# Patient Record
Sex: FEMALE | Race: ASIAN | Employment: FULL TIME | URBAN - METROPOLITAN AREA
[De-identification: names, ages, dates, MRNs, and addresses within clinical notes are randomized per-mention and may not be internally consistent; named-entity substitution may affect disease eponyms.]

---

## 2021-12-06 ENCOUNTER — HOSPITAL ENCOUNTER (OUTPATIENT)
Age: 51
Discharge: HOME | End: 2021-12-06
Payer: COMMERCIAL

## 2021-12-06 VITALS
OXYGEN SATURATION: 100 % | DIASTOLIC BLOOD PRESSURE: 91 MMHG | SYSTOLIC BLOOD PRESSURE: 155 MMHG | RESPIRATION RATE: 16 BRPM | HEART RATE: 82 BPM | TEMPERATURE: 98 F

## 2021-12-06 DIAGNOSIS — U07.1 COVID-19: Primary | ICD-10-CM

## 2021-12-06 PROCEDURE — M0243 CASIRIVI AND IMDEVI INFUSION: HCPCS | Performed by: PHYSICIAN ASSISTANT

## 2021-12-06 PROCEDURE — 99204 OFFICE O/P NEW MOD 45 MIN: CPT | Performed by: PHYSICIAN ASSISTANT

## 2021-12-06 NOTE — ED QUICK NOTES
Patient arrived for polyclonal antibody infusion. PIV started and medication given. Call light within reach. VS rechecked per protocol. Pt tolerated infusion well, no adverse reactions noted. Infusion line flushed per protocol with 0.9NS.   PIV discontin

## 2021-12-06 NOTE — ED PROVIDER NOTES
Patient Seen in: [facility] [address] [place] [address]      History   Patient presents with:  Covid    Stated Complaint: Covid-19 Infusion    Subjective:   HPI    CHIEF COMPLAINT: Covid positive, desires polyclonal infusion.      HISTORY OF PRESENT ILLNESS: [employer]    Positive for stated complaint: Covid-19 Infusion  Other systems are as noted in HPI. Constitutional and vital signs reviewed. All other systems reviewed and negative except as noted above.     Physical Exam     ED Triage Vitals [12/06/21 15 the treatment plan. All questions answered             Regen-COV Discussion  The patient has been deemed a candidate for Regen-COV (casirivimab and imdevimab). They have had mild symptoms for 5 days.  They do not require oxygen or hospitalization and have